# Patient Record
(demographics unavailable — no encounter records)

---

## 2018-11-26 PROBLEM — K50.00: Status: ACTIVE | Noted: 2017-07-12

## 2018-11-26 PROBLEM — K50.90 CROHN'S DISEASE INVOLVING STOMACH (HCC): Status: ACTIVE | Noted: 2017-07-12

## 2018-12-03 PROBLEM — K50.10 CROHN'S DISEASE OF COLON WITHOUT COMPLICATION (HCC): Status: ACTIVE | Noted: 2018-12-03

## 2018-12-03 PROCEDURE — 80299 QUANTITATIVE ASSAY DRUG: CPT | Performed by: INTERNAL MEDICINE

## 2018-12-03 PROCEDURE — 82397 CHEMILUMINESCENT ASSAY: CPT | Performed by: INTERNAL MEDICINE

## 2019-02-12 PROCEDURE — 86480 TB TEST CELL IMMUN MEASURE: CPT | Performed by: INTERNAL MEDICINE

## 2019-03-08 PROBLEM — E55.9 VITAMIN D DEFICIENCY: Status: ACTIVE | Noted: 2019-03-08

## 2020-05-22 PROBLEM — M54.2 NECK PAIN: Status: ACTIVE | Noted: 2020-05-22

## 2020-05-22 PROBLEM — R35.0 URINARY FREQUENCY: Status: ACTIVE | Noted: 2020-05-22

## 2020-05-22 PROBLEM — R12 HEARTBURN: Status: ACTIVE | Noted: 2020-05-22

## 2020-05-22 PROBLEM — G89.29 CHRONIC MIDLINE THORACIC BACK PAIN: Status: ACTIVE | Noted: 2020-05-22

## 2020-05-22 PROBLEM — M54.6 CHRONIC MIDLINE THORACIC BACK PAIN: Status: ACTIVE | Noted: 2020-05-22

## 2025-03-21 NOTE — H&P
Subjective:   Patient ID: Pineda Delgado is a 42 year old male.  Abdominal Pain  This is a new problem. Episode onset: 1month. The onset quality is sudden. The problem occurs constantly. The most recent episode lasted 2 weeks. The problem has been resolved. The pain is located in the periumbilical region. The pain is severe. Associated symptoms include nausea. Pertinent negatives include no constipation, diarrhea or hematuria. Associated symptoms comments: Once dysurea. He has tried nothing for the symptoms. His past medical history is significant for Crohn's disease.       History/Other:   Review of Systems   Gastrointestinal:  Positive for abdominal pain and nausea. Negative for constipation and diarrhea.   Genitourinary:  Negative for hematuria.   All other systems reviewed and are negative.    Current Outpatient Medications   Medication Sig Dispense Refill    albuterol 108 (90 Base) MCG/ACT Inhalation Aero Soln 2 puffs Inhalation every 4 hrs as needed for 30 days      Cholecalciferol (VITAMIN D-3) 5000 UNIT/ML Sublingual Liquid 1 capsule.      Cholecalciferol (VITAMIN D OR) Take by mouth.       Allergies:[Allergies]    [Allergies]  Allergen Reactions    Grass Coughing, ITCHING and WHEEZING    Pollen ITCHING and WHEEZING       Objective:   Physical Exam  Constitutional:       Appearance: Normal appearance. He is well-developed. He is not ill-appearing.   HENT:      Head: Normocephalic and atraumatic.      Right Ear: External ear normal.      Left Ear: External ear normal.      Nose: Nose normal.   Eyes:      General:         Right eye: No discharge.         Left eye: No discharge.      Conjunctiva/sclera: Conjunctivae normal.      Pupils: Pupils are equal, round, and reactive to light.   Neck:      Vascular: Carotid bruit present.   Cardiovascular:      Rate and Rhythm: Normal rate and regular rhythm.      Heart sounds: Normal heart sounds. No murmur heard.  Pulmonary:      Effort: Pulmonary effort is normal.  No respiratory distress.      Breath sounds: Normal breath sounds. No wheezing.      Comments: Asthma  Abdominal:      General: Bowel sounds are normal. There is no distension.      Palpations: Abdomen is soft. There is no mass.      Tenderness: There is no abdominal tenderness.   Genitourinary:     Penis: Normal.       Prostate: Normal.      Rectum: Normal.   Musculoskeletal:         General: Normal range of motion.      Cervical back: Normal range of motion and neck supple.   Skin:     General: Skin is warm and dry.   Neurological:      Mental Status: He is alert and oriented to person, place, and time.      Deep Tendon Reflexes: Reflexes are normal and symmetric.         Assessment & Plan:   1. Annual physical exam    2. Vitamin D deficiency    3. Elevated sed rate    4. Polyarthralgia    5. History of rheumatoid arthritis    6. History of Crohn's disease      1. Annual physical exam  Left ear flush done.  - Lipid Panel; Future  - CBC With Differential With Platelet; Future  - Comp Metabolic Panel (14); Future  - TSH W Reflex To Free T4; Future  - Vitamin D [E]; Future  - Sed Rate, Westergren (Automated) [E]; Future    2. Vitamin D deficiency  - Vitamin D [E]; Future    3. Elevated sed rate  - Sed Rate, Westergren (Automated) [E]; Future    4. Polyarthralgia  - Connective Tissue Disease (ARBEN) Screen, Reflex Specific Antibody; Future  - Cyclic Citrullinate Pep. IGG [E]; Future    5. History of rheumatoid arthritis  - Rheumatoid Arthritis Factor [E]; Future  - Cyclic Citrullinate Pep. IGG [E]; Future    6. History of Crohn's disease  - Gastro Referral - In Network    Orders Placed This Encounter   Procedures    Lipid Panel    CBC With Differential With Platelet    Comp Metabolic Panel (14)    TSH W Reflex To Free T4    Vitamin D [E]    Sed Rate, Westergren (Automated) [E]    Rheumatoid Arthritis Factor [E]    Connective Tissue Disease (ARBEN) Screen, Reflex Specific Antibody    Cyclic Citrullinate Pep. IGG [E]        Meds This Visit:  Requested Prescriptions      No prescriptions requested or ordered in this encounter       Imaging & Referrals:  Colonoscopy  GI doctor  Blood test